# Patient Record
Sex: FEMALE | URBAN - METROPOLITAN AREA
[De-identification: names, ages, dates, MRNs, and addresses within clinical notes are randomized per-mention and may not be internally consistent; named-entity substitution may affect disease eponyms.]

---
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
## 2019-09-13 ENCOUNTER — OFFICE VISIT (OUTPATIENT)
Dept: FAMILY MEDICINE | Facility: CLINIC | Age: 19
End: 2019-09-13
Payer: COMMERCIAL

## 2019-09-13 VITALS
WEIGHT: 114 LBS | SYSTOLIC BLOOD PRESSURE: 120 MMHG | DIASTOLIC BLOOD PRESSURE: 78 MMHG | BODY MASS INDEX: 20.2 KG/M2 | HEART RATE: 85 BPM | HEIGHT: 63 IN

## 2019-09-13 DIAGNOSIS — M79.673 ARCH PAIN, UNSPECIFIED LATERALITY: ICD-10-CM

## 2019-09-13 DIAGNOSIS — M25.80 SESAMOIDITIS: Primary | ICD-10-CM

## 2019-09-13 DIAGNOSIS — M79.671 RIGHT FOOT PAIN: ICD-10-CM

## 2019-09-13 ASSESSMENT — MIFFLIN-ST. JEOR: SCORE: 1256.23

## 2019-09-13 NOTE — LETTER
Date:September 17, 2019      Patient was self referred, no letter generated. Do not send.        [facility] Health Information

## 2019-09-13 NOTE — LETTER
"  9/13/2019      RE: Libby Menjivar  [address] [facility] [address]       HISTORY OF PRESENT ILLNESS  Ms. Menjivar is a pleasant 19 year old year old female who presents to clinic today with ongoing right foot pain related to sesamoiditis  Libby explains that she has been using her custom inserts  Location: right foot under great toe  Quality:  achy pain    Severity: 5/10 at worst    Duration: over a year  Timing: occurs intermittently    Modifying factors:  resting and non-use makes it better, movement and use makes it worse  Associated signs & symptoms: no swelling    Additional history: as documented    MEDICAL HISTORY  There is no problem list on file for this patient.      Current Outpatient Medications   Medication Sig Dispense Refill     albuterol (PROAIR HFA/PROVENTIL HFA/VENTOLIN HFA) 108 (90 Base) MCG/ACT Inhaler Inhale 2 puffs into the lungs every 6 hours as needed for shortness of breath / dyspnea or wheezing (Patient not taking: Reported on 9/13/2019) 1 Inhaler 1     cyclobenzaprine (FLEXERIL) 10 MG tablet Take 1 tablet (10 mg) by mouth 3 times daily as needed for muscle spasms (Patient not taking: Reported on 11/2/2018) 14 tablet 0       Allergies   Allergen Reactions     Penicillin G        No family history on file.    Additional medical/Social/Surgical histories reviewed in [facility] and updated as appropriate.     REVIEW OF SYSTEMS (9/13/2019)  10 point ROS of systems including Constitutional, Eyes, Respiratory, Cardiovascular, Gastroenterology, Genitourinary, Integumentary, Musculoskeletal, Psychiatric were all negative except for pertinent positives noted in my HPI.     PHYSICAL EXAM  Vitals:    09/13/19 1000   BP: 120/78   Pulse: 85   Weight: 51.7 kg (114 lb)   Height: 1.6 m (5' 3\")     Vital Signs: /78   Pulse 85   Ht 1.6 m (5' 3\")   Wt 51.7 kg (114 lb)   LMP 09/10/2019 (Exact Date)   Breastfeeding? No   BMI 20.19 kg/m    Patient declined being weighed. Body mass index is 20.19 " kg/m .    General  - normal appearance, in no obvious distress  CV  - normal pulses at posterior tib and dorsalis pedis  Pulm  - normal respiratory pattern, non-labored  Musculoskeletal - right foot  - stance: gait does not favors affected side, not reluctant to bear weight  - inspection: no  swelling laterally, normal bone and joint alignment, no obvious deformity  - palpation: tenderness over base of great toe, sesamoids, no tenderness over lateral or medial malleoli, navicular, or base of 5th MT  - ROM: intact globally but not limited secondary to pain  - strength: 5/5 great toe strength with flex/ext    Neuro  - no sensory or motor deficit, grossly normal coordination, normal muscle tone  Skin  - no ecchymosis overlying lateral foot-ankle junction, no warmth or induration, no obvious rash  Psych  - interactive, appropriate, normal mood and affect    ASSESSMENT & PLAN  20 yo female with chronic recurrent sesamoiditis right great toe/foot and left at times  Fitted for orthotics to replace her previous  Ice PRN  Toe exercises  Aleve bid PRN  F/u PRN    Tin Khoury MD, CAQSM    Tin Khoury MD

## 2019-09-13 NOTE — PROGRESS NOTES
"HISTORY OF PRESENT ILLNESS  Ms. Menjivar is a pleasant 19 year old year old female who presents to clinic today with ongoing right foot pain related to sesamoiditis  Libby explains that she has been using her custom inserts  Location: right foot under great toe  Quality:  achy pain    Severity: 5/10 at worst    Duration: over a year  Timing: occurs intermittently    Modifying factors:  resting and non-use makes it better, movement and use makes it worse  Associated signs & symptoms: no swelling    Additional history: as documented    MEDICAL HISTORY  There is no problem list on file for this patient.      Current Outpatient Medications   Medication Sig Dispense Refill     albuterol (PROAIR HFA/PROVENTIL HFA/VENTOLIN HFA) 108 (90 Base) MCG/ACT Inhaler Inhale 2 puffs into the lungs every 6 hours as needed for shortness of breath / dyspnea or wheezing (Patient not taking: Reported on 9/13/2019) 1 Inhaler 1     cyclobenzaprine (FLEXERIL) 10 MG tablet Take 1 tablet (10 mg) by mouth 3 times daily as needed for muscle spasms (Patient not taking: Reported on 11/2/2018) 14 tablet 0       Allergies   Allergen Reactions     Penicillin G        No family history on file.    Additional medical/Social/Surgical histories reviewed in EPIC and updated as appropriate.     REVIEW OF SYSTEMS (9/13/2019)  10 point ROS of systems including Constitutional, Eyes, Respiratory, Cardiovascular, Gastroenterology, Genitourinary, Integumentary, Musculoskeletal, Psychiatric were all negative except for pertinent positives noted in my HPI.     PHYSICAL EXAM  Vitals:    09/13/19 1000   BP: 120/78   Pulse: 85   Weight: 51.7 kg (114 lb)   Height: 1.6 m (5' 3\")     Vital Signs: /78   Pulse 85   Ht 1.6 m (5' 3\")   Wt 51.7 kg (114 lb)   LMP 09/10/2019 (Exact Date)   Breastfeeding? No   BMI 20.19 kg/m   Patient declined being weighed. Body mass index is 20.19 kg/m .    General  - normal appearance, in no obvious distress  CV  - normal pulses " at posterior tib and dorsalis pedis  Pulm  - normal respiratory pattern, non-labored  Musculoskeletal - right foot  - stance: gait does not favors affected side, not reluctant to bear weight  - inspection: no  swelling laterally, normal bone and joint alignment, no obvious deformity  - palpation: tenderness over base of great toe, sesamoids, no tenderness over lateral or medial malleoli, navicular, or base of 5th MT  - ROM: intact globally but not limited secondary to pain  - strength: 5/5 great toe strength with flex/ext    Neuro  - no sensory or motor deficit, grossly normal coordination, normal muscle tone  Skin  - no ecchymosis overlying lateral foot-ankle junction, no warmth or induration, no obvious rash  Psych  - interactive, appropriate, normal mood and affect    ASSESSMENT & PLAN  20 yo female with chronic recurrent sesamoiditis right great toe/foot and left at times  Fitted for orthotics to replace her previous  Ice PRN  Toe exercises  Aleve bid PRN  F/u PRN    Tin Khoury MD, CAQSM